# Patient Record
Sex: MALE | Race: BLACK OR AFRICAN AMERICAN | NOT HISPANIC OR LATINO | Employment: FULL TIME | URBAN - METROPOLITAN AREA
[De-identification: names, ages, dates, MRNs, and addresses within clinical notes are randomized per-mention and may not be internally consistent; named-entity substitution may affect disease eponyms.]

---

## 2017-05-01 VITALS
TEMPERATURE: 98 F | DIASTOLIC BLOOD PRESSURE: 91 MMHG | SYSTOLIC BLOOD PRESSURE: 146 MMHG | OXYGEN SATURATION: 97 % | BODY MASS INDEX: 28.79 KG/M2 | WEIGHT: 190 LBS | HEART RATE: 67 BPM | RESPIRATION RATE: 18 BRPM | HEIGHT: 68 IN

## 2017-05-01 PROCEDURE — 99283 EMERGENCY DEPT VISIT LOW MDM: CPT | Mod: 25

## 2017-05-01 PROCEDURE — 96372 THER/PROPH/DIAG INJ SC/IM: CPT

## 2017-05-01 PROCEDURE — 99284 EMERGENCY DEPT VISIT MOD MDM: CPT | Mod: ,,,

## 2017-05-02 ENCOUNTER — HOSPITAL ENCOUNTER (EMERGENCY)
Facility: HOSPITAL | Age: 47
Discharge: HOME | End: 2017-05-02
Attending: EMERGENCY MEDICINE
Payer: COMMERCIAL

## 2017-05-02 DIAGNOSIS — M54.31 SCIATICA OF RIGHT SIDE: Primary | ICD-10-CM

## 2017-05-02 DIAGNOSIS — M25.511 CHRONIC RIGHT SHOULDER PAIN: ICD-10-CM

## 2017-05-02 DIAGNOSIS — G89.29 CHRONIC RIGHT SHOULDER PAIN: ICD-10-CM

## 2017-05-02 PROCEDURE — 63600175 PHARM REV CODE 636 W HCPCS

## 2017-05-02 PROCEDURE — 25000003 PHARM REV CODE 250

## 2017-05-02 RX ORDER — NAPROXEN 500 MG/1
500 TABLET ORAL
Status: COMPLETED | OUTPATIENT
Start: 2017-05-02 | End: 2017-05-02

## 2017-05-02 RX ORDER — ORPHENADRINE CITRATE 30 MG/ML
60 INJECTION INTRAMUSCULAR; INTRAVENOUS
Status: COMPLETED | OUTPATIENT
Start: 2017-05-02 | End: 2017-05-02

## 2017-05-02 RX ORDER — NAPROXEN 500 MG/1
500 TABLET ORAL 2 TIMES DAILY WITH MEALS
Qty: 12 TABLET | Refills: 0 | Status: SHIPPED | OUTPATIENT
Start: 2017-05-02

## 2017-05-02 RX ORDER — METHOCARBAMOL 500 MG/1
1000 TABLET, FILM COATED ORAL 3 TIMES DAILY
Qty: 18 TABLET | Refills: 0 | Status: SHIPPED | OUTPATIENT
Start: 2017-05-02 | End: 2017-05-05

## 2017-05-02 RX ADMIN — NAPROXEN 500 MG: 500 TABLET ORAL at 12:05

## 2017-05-02 RX ADMIN — ORPHENADRINE CITRATE 60 MG: 30 INJECTION INTRAMUSCULAR; INTRAVENOUS at 12:05

## 2017-05-02 NOTE — DISCHARGE INSTRUCTIONS
Sciatica    Sciatica is a condition that causes pain in the lower back that spreads down into the buttock, hip, and leg. Sometimes the leg pain can happen without any back pain. Sciatica happens when a spinal nerve is irritated or has pressure put on it as comes out of the spinal canal in the lower back. This most often happens when a bulge or rupture of a nearby spinal disk presses on the nerve. Sciatica can also be caused by a narrowing of the spinal canal (spinal stenosis) or spasm of the muscle in the buttocks that the sciatic nerve passes through (pyriform muscle). Sciatica is also called lumbar radiculopathy.  Sciatica may begin after a sudden twisting or bending force, such as in a car accident. Or it can happen after a simple awkward movement. In either case, muscle spasm often also happens. Muscle spasm makes the pain worse.  A healthcare provider makes a diagnosis of sciatica from your symptoms and a physical exam. Unless you had an injury from a car accident or fall, you usually wont have X-rays taken at this time. This is because the nerves and disks in your back cant be seen on an X-ray. If the provider sees signs of a compressed nerve, you will need to schedule an MRI scan as an outpatient. Signs of a compressed nerve include loss of strength in a leg.  Most sciatica gets better with medicine, exercise, and physical therapy. If your symptoms continue after at least 3 months of medical treatment, you may need surgery or injections to your lower back.  Home care  Follow these tips when caring for yourself at home:  · You may need to stay in bed the first few days. But as soon as possible, begin sitting up or walking. This will help you avoid problems that come from staying in bed for long periods.  · When in bed, try to find a position that is comfortable. A firm mattress is best. Try lying flat on your back with pillows under your knees. You can also try lying on your side with your knees bent up  toward your chest and a pillow between your knees.  · Avoid sitting for long periods. This puts more stress on your lower back than standing or walking.  · Use heat from a hot shower, hot bath, or heating pad to help ease pain. Massage can also help. You can also try using an ice pack. You can make your own ice pack by putting ice cubes in a plastic bag. Wrap the bag in a thin towel. Try both heat and cold to see which works best. Use the method that feels best for 20 minutes several times a day.  · You may use acetaminophen or ibuprofen to ease pain, unless another pain medicine was prescribed. Note: If you have chronic liver or kidney disease, talk with your healthcare provider before taking these medicines. Also talk with your provider if youve had a stomach ulcer or gastrointestinal bleeding.  · Use safe lifting methods. Dont lift anything heavier than 15 pounds until all of the pain is gone.  Follow-up care  Follow up with your healthcare provider, or as advised. You may need physical therapy or additional tests.  If X-rays were taken, a radiologist will look at them. You will be told of any new findings that may affect your care.  When to seek medical advice  Call your healthcare provider right away if any of these occur:  · Pain gets worse even after taking prescribed medicine  · Weakness or numbness in 1 or both legs or hips  · Numbness in your groin or genital area  · You cant control your bowel or bladder  · Fever  · Redness or swelling over your back or spine   Date Last Reviewed: 8/1/2016  © 9812-0736 [employer]. [address] [place] [address], [place] [address]. All rights reserved. This information is not intended as a substitute for professional medical care. Always follow your healthcare professional's instructions.              Caring for Your Back Throughout the Day  Take care of your back throughout the day. You will likely have fewer back problems if you do. Try to warm up before you  move. Shift positions often. Also do your best to form healthy habits.    Warm up for the day  Do a few slow, catlike stretches before starting your day. This simple warmup can soften your disks, stretch your back muscles, and help prevent injuries.  Shift positions often  At work and at home, change positions often. This helps keep your body from getting stiff. Stand up or lean back while you sit. If you can, get up and move every 1/2 hour.  Form healthy habits  Here are some suggestions:   · Keep a healthy weight. When you weigh too much, your back is under excess strain. But losing just a few extra pounds can help a lot.  · Try not to overeat. Learn about serving sizes. The size of a serving depends on the food and the food group. Many foods list serving sizes on the labels.  · Handle minor aches with cold and heat. Apply cold the first 24 to 48 hours. Use heat after that. Always place a thin cloth between your skin and the source of cold or heat.  · Take medicines as directed. This helps keep pain under control. Always read labels, and call your healthcare provider or pharmacist if you have any questions.  Walk each day  A daily walk keeps your back and thigh muscles stretched and strong. This gives your back better support. Be sure to walk with your spines three curves aligned, by keeping your head, hips, and toes connected by a vertical line.   Date Last Reviewed: 10/18/2015  © 5492-9312 [employer]. [address] [place] [address], [place] [address]. All rights reserved. This information is not intended as a substitute for professional medical care. Always follow your healthcare professional's instructions.

## 2017-05-02 NOTE — ED AVS SNAPSHOT
[facility]  [address]  [facility] [address]               Cabrera Lomeli   2017 12:32 AM   ED    Description:  Male : 1970   [facility]           Your Care was Coordinated By:     Provider Role From To    Stephenie Ron MD Attending Provider 17 0033 --    Hetal Beebe PA-C Physician Assistant 17 --      Reason for Visit     Leg Pain           Diagnoses this Visit        Comments    Sciatica of right side    -  Primary       ED Disposition     ED Disposition Condition Comment    Discharge             To Do List           Follow-up Information     Schedule an appointment as soon as possible for a visit with Gavino Almonte MD.    Specialty:  Internal Medicine    Contact information:    [address]  [address]  [facility] [address]  [phone]         These Medications        Disp Refills Start End    naproxen (NAPROSYN) 500 MG tablet 12 tablet 0 2017     Take 1 tablet (500 mg total) by mouth 2 (two) times daily with meals. - Oral    Pharmacy: [facility] [id number] [facility] [address] AT [facility] [employer] [facility] #: [phone]       methocarbamol (ROBAXIN) 500 MG Tab 18 tablet 0 2017    Take 2 tablets (1,000 mg total) by mouth 3 (three) times daily. - Oral    Pharmacy: [facility] [address] [facility] [address] AT [facility] [employer] [facility] #: [phone]         [facility]     [facility] Care [facility] -  Assistance  Unless otherwise directed by your provider, please contact [facility], our nurse care line that is available for  assistance.     Registered nurses in the [facility] provide: appointment scheduling, clinical advisement, health education, and other advisory services.  Call: [phone] (toll free)               Medications           Message regarding Medications   "   Verify the changes and/or additions to your medication regime listed below are the same as discussed with your clinician today.  If any of these changes or additions are incorrect, please notify your healthcare provider.        START taking these NEW medications        Refills    naproxen (NAPROSYN) 500 MG tablet 0    Sig: Take 1 tablet (500 mg total) by mouth 2 (two) times daily with meals.    Class: Print    Route: Oral    methocarbamol (ROBAXIN) 500 MG Tab 0    Sig: Take 2 tablets (1,000 mg total) by mouth 3 (three) times daily.    Class: Print    Route: Oral      These medications were administered today        Dose Freq    orphenadrine injection 60 mg 60 mg ED 1 Time    Sig: Inject 2 mLs (60 mg total) into the muscle ED 1 Time.    Class: Normal    Route: Intramuscular    Cosign for Ordering: Required by Stephenie Ron MD    naproxen tablet 500 mg 500 mg ED 1 Time    Sig: Take 1 tablet (500 mg total) by mouth ED 1 Time.    Class: Normal    Route: Oral    Cosign for Ordering: Required by Stephenie Ron MD           Verify that the below list of medications is an accurate representation of the medications you are currently taking.  If none reported, the list may be blank. If incorrect, please contact your healthcare provider. Carry this list with you in case of emergency.           Current Medications     methocarbamol (ROBAXIN) 500 MG Tab Take 2 tablets (1,000 mg total) by mouth 3 (three) times daily.    naproxen (NAPROSYN) 500 MG tablet Take 1 tablet (500 mg total) by mouth 2 (two) times daily with meals.    naproxen tablet 500 mg Take 1 tablet (500 mg total) by mouth ED 1 Time.    orphenadrine injection 60 mg Inject 2 mLs (60 mg total) into the muscle ED 1 Time.           Clinical Reference Information           Your Vitals Were     BP Pulse Temp Resp Height Weight    146/91 (BP Location: Left arm, Patient Position: Sitting) 67 98.3 °F (36.8 °C) (Oral) 18 5' 8" (1.727 m) 86.2 kg (190 lb)    SpO2 BMI    "          97% 28.89 kg/m2         Allergies as of 5/2/2017     No Known Allergies      Immunizations Administered on Date of Encounter - 5/2/2017     None      ED Micro, Lab, POCT     None      ED Imaging Orders     None        Discharge Instructions         Sciatica    Sciatica is a condition that causes pain in the lower back that spreads down into the buttock, hip, and leg. Sometimes the leg pain can happen without any back pain. Sciatica happens when a spinal nerve is irritated or has pressure put on it as comes out of the spinal canal in the lower back. This most often happens when a bulge or rupture of a nearby spinal disk presses on the nerve. Sciatica can also be caused by a narrowing of the spinal canal (spinal stenosis) or spasm of the muscle in the buttocks that the sciatic nerve passes through (pyriform muscle). Sciatica is also called lumbar radiculopathy.  Sciatica may begin after a sudden twisting or bending force, such as in a car accident. Or it can happen after a simple awkward movement. In either case, muscle spasm often also happens. Muscle spasm makes the pain worse.  A healthcare provider makes a diagnosis of sciatica from your symptoms and a physical exam. Unless you had an injury from a car accident or fall, you usually wont have X-rays taken at this time. This is because the nerves and disks in your back cant be seen on an X-ray. If the provider sees signs of a compressed nerve, you will need to schedule an MRI scan as an outpatient. Signs of a compressed nerve include loss of strength in a leg.  Most sciatica gets better with medicine, exercise, and physical therapy. If your symptoms continue after at least 3 months of medical treatment, you may need surgery or injections to your lower back.  Home care  Follow these tips when caring for yourself at home:  · You may need to stay in bed the first few days. But as soon as possible, begin sitting up or walking. This will help you avoid  problems that come from staying in bed for long periods.  · When in bed, try to find a position that is comfortable. A firm mattress is best. Try lying flat on your back with pillows under your knees. You can also try lying on your side with your knees bent up toward your chest and a pillow between your knees.  · Avoid sitting for long periods. This puts more stress on your lower back than standing or walking.  · Use heat from a hot shower, hot bath, or heating pad to help ease pain. Massage can also help. You can also try using an ice pack. You can make your own ice pack by putting ice cubes in a plastic bag. Wrap the bag in a thin towel. Try both heat and cold to see which works best. Use the method that feels best for 20 minutes several times a day.  · You may use acetaminophen or ibuprofen to ease pain, unless another pain medicine was prescribed. Note: If you have chronic liver or kidney disease, talk with your healthcare provider before taking these medicines. Also talk with your provider if youve had a stomach ulcer or gastrointestinal bleeding.  · Use safe lifting methods. Dont lift anything heavier than 15 pounds until all of the pain is gone.  Follow-up care  Follow up with your healthcare provider, or as advised. You may need physical therapy or additional tests.  If X-rays were taken, a radiologist will look at them. You will be told of any new findings that may affect your care.  When to seek medical advice  Call your healthcare provider right away if any of these occur:  · Pain gets worse even after taking prescribed medicine  · Weakness or numbness in 1 or both legs or hips  · Numbness in your groin or genital area  · You cant control your bowel or bladder  · Fever  · Redness or swelling over your back or spine   Date Last Reviewed: 8/1/2016  © 4108-4694 [employer]. [address] [place] [address], [place] [address]. All rights reserved. This information is not intended as a substitute for  professional medical care. Always follow your healthcare professional's instructions.              Caring for Your Back Throughout the Day  Take care of your back throughout the day. You will likely have fewer back problems if you do. Try to warm up before you move. Shift positions often. Also do your best to form healthy habits.    Warm up for the day  Do a few slow, catlike stretches before starting your day. This simple warmup can soften your disks, stretch your back muscles, and help prevent injuries.  Shift positions often  At work and at home, change positions often. This helps keep your body from getting stiff. Stand up or lean back while you sit. If you can, get up and move every 1/2 hour.  Form healthy habits  Here are some suggestions:   · Keep a healthy weight. When you weigh too much, your back is under excess strain. But losing just a few extra pounds can help a lot.  · Try not to overeat. Learn about serving sizes. The size of a serving depends on the food and the food group. Many foods list serving sizes on the labels.  · Handle minor aches with cold and heat. Apply cold the first 24 to 48 hours. Use heat after that. Always place a thin cloth between your skin and the source of cold or heat.  · Take medicines as directed. This helps keep pain under control. Always read labels, and call your healthcare provider or pharmacist if you have any questions.  Walk each day  A daily walk keeps your back and thigh muscles stretched and strong. This gives your back better support. Be sure to walk with your spines three curves aligned, by keeping your head, hips, and toes connected by a vertical line.   Date Last Reviewed: 10/18/2015  © 4351-6904 [employer]. [address] [place] [address], [place] [address]. All rights reserved. This information is not intended as a substitute for professional medical care. Always follow your healthcare professional's instructions.           [facility]  complies with applicable Federal civil rights laws and does not discriminate on the basis of race, color, national origin, age, disability, or sex.        Language Assistance Services     ATTENTION: Language assistance services are available, free of charge. Please call [phone].      ATENCIÓN: Si habla español, tiene a hernandez disposición servicios gratuitos de asistencia lingüística. Llame al [phone].     CHÚ Ý: N?u b?n nói Ti?ng Vi?t, có các d?ch v? h? tr? ngôn ng? mi?n phí dành cho b?n. G?i s? [phone].

## 2017-05-02 NOTE — ED PROVIDER NOTES
Encounter Date: 5/1/2017    SCRIBE #1 NOTE: I, Leonardolorri Orozco, am scribing for, and in the presence of,  Hetal Beebe. I have scribed the following portions of the note - Other sections scribed: JORGE ALVARADO.   SCRIBE #2 NOTE: I, Darren Naresh, am scribing for, and in the presence of,  Dr. Ron. I have scribed the following portions of the note - the APC attestation.     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 right shoulder pain x 6-7months     Review of patient's allergies indicates:  No Known Allergies  HPI Comments: Time seen by provider: 12:34 AM    This is a 46 y.o. male with pertinent PMHx of UTI who presents to the ED with a chief complaint of (currently resolved for 2 weeks) brief daily flashes of moderate to severe sharp stabbing RLE pain x 2 months with associated tingling in his leg today. Pt states the pain would come on only while he was sitting down and would force him to stand up/kick his leg out, he states this would happen about once a day and was worsening but now hasn't happened for the last 2 weeks. However the pt has been feeling tingling in his leg today and thinks the pain is going to start up again. Pt denies history of sciatica, urine symptoms, bowel symptoms, chest pain, shortness of breath, abdominal pain, significant back pain, headache, or rashes. Pt also endorses secondary chronic right shoulder pain x 8 months after lifting weights that is unchanged but sore. Pt states he only drinks hot tea for his pains. There are no other associated symptoms or mitigating/aggravating factors reported at this time.     The history is provided by the patient.     Past Medical History:   Diagnosis Date    UTI (urinary tract infection) 12/30/2014     No past surgical history on file.  Family History   Problem Relation Age of Onset    Heart disease Mother     Stroke Mother   "   Cancer Neg Hx     Diabetes Neg Hx     Kidney disease Neg Hx      Social History   Substance Use Topics    Smoking status: Never Smoker    Smokeless tobacco: Not on file    Alcohol use Yes     Review of Systems   Constitutional: Negative for chills, fatigue and fever.   HENT: Negative for congestion.    Respiratory: Negative for shortness of breath.    Cardiovascular: Negative for chest pain.   Gastrointestinal: Negative for abdominal pain, constipation and diarrhea.   Genitourinary: Negative for difficulty urinating and dysuria.   Musculoskeletal: Negative for back pain.        Positive right shoulder pain.    Skin: Negative for rash.   Neurological: Positive for numbness (tingling sensation to right posterior thigh). Negative for headaches.        Positive right leg "tingling"   Psychiatric/Behavioral: The patient is not nervous/anxious.        Physical Exam   Initial Vitals   BP Pulse Resp Temp SpO2   05/01/17 2204 05/01/17 2204 05/01/17 2204 05/01/17 2204 05/01/17 2204   146/91 67 18 98.3 °F (36.8 °C) 97 %     Physical Exam    Vitals reviewed.  Constitutional: He appears well-developed and well-nourished. He is not diaphoretic. No distress.   HENT:   Head: Normocephalic and atraumatic.   Nose: Nose normal.   Mouth/Throat: No oropharyngeal exudate.   Eyes: Conjunctivae and EOM are normal. Pupils are equal, round, and reactive to light.   Neck: Normal range of motion. Neck supple. No thyromegaly present.   Cardiovascular: Normal rate and regular rhythm.   No murmur heard.  Pulmonary/Chest: Breath sounds normal. No respiratory distress. He has no wheezes. He has no rhonchi. He has no rales. He exhibits no tenderness.   Abdominal: Soft. Bowel sounds are normal. He exhibits no distension. There is no tenderness.   Musculoskeletal: Normal range of motion. He exhibits no edema.        Right upper leg: He exhibits no tenderness and no bony tenderness.   Lymphadenopathy:     He has no cervical adenopathy. "   Neurological: He is alert. He has normal strength and normal reflexes. He displays normal reflexes. No sensory deficit.   Skin: Skin is warm. No rash noted.   Psychiatric: He has a normal mood and affect.         ED Course   Procedures  Labs Reviewed - No data to display          Medical Decision Making:   History:   Old Medical Records: I decided to obtain old medical records.       APC / Resident Notes:   This is a 46 y.o. male with pertinent PMHx of UTI who presents to the ED with a chief complaint of (currently resolved for 2 weeks) brief daily flashes of moderate to severe sharp stabbing RLE pain x 2 months with associated tingling in his leg today.  Cardiac exam reveals regular rate ad rhythm.  Abdomn is soft, nonender, nondistended normal bowel sounds ×4.  Lungs are clear bilaterally on auscultation and decreased breath sounds.  No bony tenderness.  No muscular tenderness.  No pain with active or passive range of motion right shoulder or right lower extremity.  Strength intact.  Sensation intact.  Distal pulses intact.  Patient able to ambulate without difficulty.  Romberg negative.  Vital stable.  Patient is in no acute distress.    Patient given IM Norflex 60 mg and naproxen 500 mg.    DDX includes but is not limited to sciatica, lumbar radiculopathy, muscle spasm.    Discharged to home in stable condition, return to ED warnings given, follow up and patient care instructions given. Patient discharged home with robaxin 500mg and naproxen 500mg.     I discussed and reviewed with my supervising physician.       Scribe Attestation:   Scribe #1: I performed the above scribed service and the documentation accurately describes the services I performed. I attest to the accuracy of the note.  Scribe #2: I performed the above scribed service and the documentation accurately describes the services I performed. I attest to the accuracy of the note.    Attending Attestation:     Physician Attestation Statement for  NP/PA:   I discussed this assessment and plan of this patient with the NP/PA, but I did not personally examine the patient. The face to face encounter was performed by the NP/PA.    Other NP/PA Attestation Additions:    History of Present Illness: Sciatica          Physician Attestation for Scribe:  Physician Attestation Statement for Scribe #1: I, Hetal Beebe, reviewed documentation, as scribed by Leonardo Orozco in my presence, and it is both accurate and complete.   Physician Attestation Statement for Scribe #2: I, Dr. Ron, reviewed documentation, as scribed by Darren Infante in my presence, and it is both accurate and complete. I also acknowledge and confirm the content of the note done by Noibe #1.              ED Course     Clinical Impression:   The primary encounter diagnosis was Sciatica of right side. A diagnosis of Chronic right shoulder pain was also pertinent to this visit.    Disposition:   Disposition: Discharged  Condition: Stable       Hetal Beebe PA-C  05/02/17 0147

## 2024-08-25 ENCOUNTER — OFFICE VISIT (OUTPATIENT)
Dept: URGENT CARE | Facility: CLINIC | Age: 54
End: 2024-08-25

## 2024-08-25 VITALS
HEIGHT: 68 IN | WEIGHT: 190.06 LBS | HEART RATE: 62 BPM | RESPIRATION RATE: 18 BRPM | OXYGEN SATURATION: 98 % | DIASTOLIC BLOOD PRESSURE: 90 MMHG | SYSTOLIC BLOOD PRESSURE: 137 MMHG | TEMPERATURE: 98 F | BODY MASS INDEX: 28.8 KG/M2

## 2024-08-25 DIAGNOSIS — B35.1 ONYCHOMYCOSIS: Primary | ICD-10-CM

## 2024-08-25 DIAGNOSIS — R20.0 NUMBNESS AND TINGLING IN BOTH HANDS: ICD-10-CM

## 2024-08-25 DIAGNOSIS — B35.3 TINEA PEDIS OF BOTH FEET: ICD-10-CM

## 2024-08-25 DIAGNOSIS — R20.2 NUMBNESS AND TINGLING IN BOTH HANDS: ICD-10-CM

## 2024-08-25 PROCEDURE — 99214 OFFICE O/P EST MOD 30 MIN: CPT | Mod: TIER,S$GLB,, | Performed by: FAMILY MEDICINE

## 2024-08-25 RX ORDER — CLOTRIMAZOLE 1 %
CREAM (GRAM) TOPICAL
Qty: 28 G | Refills: 1 | Status: SHIPPED | OUTPATIENT
Start: 2024-08-25

## 2024-08-25 RX ORDER — CICLOPIROX 80 MG/ML
SOLUTION TOPICAL
Qty: 6.6 ML | Refills: 11 | Status: SHIPPED | OUTPATIENT
Start: 2024-08-25

## 2024-08-25 NOTE — PROGRESS NOTES
"Subjective:      Patient ID: Cabrera Lomeli is a 53 y.o. male.    Vitals:  height is 5' 8" (1.727 m) and weight is 86.2 kg (190 lb 0.6 oz). His temperature is 98.3 °F (36.8 °C). His blood pressure is 137/90 (abnormal) and his pulse is 62. His respiration is 18 and oxygen saturation is 98%.     Chief Complaint: Nail Problem    Pt states that the his nails on both feet are dark discolored and been going on for many months if not years.  and present to be itchy and feet are peeling which has been going on for weeks to months.  Pt also stated that his fingers are also tingling .  He reports no trauma to the neck or neck pain.  No tingling in the feet.  He has not had a primary care visit or wellness visit in many years.  Has not had blood work in many years.  He reports no diabetes.  He had a slight elevated hemoglobin A1c many years ago recorded in  [facility].     Other  This is a chronic problem. The current episode started more than 1 month ago. Pertinent negatives include no numbness. Treatments tried: cortisone cream.       Neurological:  Negative for numbness.      Objective:     Physical Exam  Constitutional: Pt oriented to person, place, and time.  Non-toxic appearance.   Patient does not appear ill. No distress. normal  HENT: No icterus or facial swelling appreciated  Head: Normocephalic and atraumatic.   Nose: No congestion.   Pulmonary/Chest: Effort normal. No stridor. No respiratory distress.   Abdominal: Normal appearance. Abdomen exhibits no distension.   Musculoskeletal:         General: No swelling.   Neurological: no focal deficit. Patient is alert and oriented to person, place, and time.   Skin: feet:  There is thickening and darkening of the toenails with subungual debris most toes bilateral feet.    There is some xerosis and small amount of skin peeling of dry skin on bilateral feet skin  Psychiatric: Patients behavior is normal. Mood, judgment and thought content normal.     Assessment:     1. Onychomycosis "    2. Tinea pedis of both feet    3. Numbness and tingling in both hands        Plan:       Onychomycosis  -     ciclopirox (PENLAC) 8 % Soln; Apply nightly to nails x 48 weeks. Apply at least 8 hours before next shower/bath. Clean nails with rubbing alcohol once weekly during treatment  Dispense: 6.6 mL; Refill: 11  -     Ambulatory referral/consult to Podiatry    Tinea pedis of both feet  -     clotrimazole (LOTRIMIN) 1 % cream; Apply to both feet  twice daily x  28 days. Apply nightly dose after using Penlac solution to nails .  Dispense: 28 g; Refill: 1  -     Ambulatory referral/consult to Podiatry    Numbness and tingling in both hands  Needs workup by PCP or neurologist.  Discussed with patient.     Patient did have blood sugar test today which read just over 100    Patient Instructions   For your toenail issue, it appears like it was a toenail fungus.    I recommend using the Penlac solution as prescribed.  If it is pleurisy out-of-pocket you can use apps such as goodRx.com to find that she missed pharmacy.        In addition I recommend using clotrimazole 1% cream.  This is prescription or over-the-counter.  I would recommend purchasing the 1 that is cheapest.  Use this twice daily for your feet for the next 28 days or 1 month.      As for the hand numbness it is difficult to say with the causes in an urgent care setting as it maybe metabolic such as thyroid issue or vitamin deficiencies such as B12 or folic acid or high sugars or it could even be a nerve issue from the neck.      I do recommend getting established with a primary care physician for routine health screenings.  For routine blood work you may consider even ordering your own wellness panel and then taking the results to a primary care physician for their review and recommendations.  There is an online 3rd party service called [employer] where a patient can order their own lab tests without a provider order.     You can always follow up with  the podiatrist for your foot conditions.  There is an [facility] pod[facility]rist , Leni Mathis I have used myself and was quite knowlegable.    [facility] with Dr Mathis:  [id number] [place] [address]  Phone: [phone]  You can also considered a private podiatrist outside of the [facility].

## 2024-08-25 NOTE — PATIENT INSTRUCTIONS
For your toenail issue, it appears like it was a toenail fungus.    I recommend using the Penlac solution as prescribed.  If it is pleurisy out-of-pocket you can use apps such as goodRx.com to find that she missed pharmacy.        In addition I recommend using clotrimazole 1% cream.  This is prescription or over-the-counter.  I would recommend purchasing the 1 that is cheapest.  Use this twice daily for your feet for the next 28 days or 1 month.      As for the hand numbness it is difficult to say with the causes in an urgent care setting as it maybe metabolic such as thyroid issue or vitamin deficiencies such as B12 or folic acid or high sugars or it could even be a nerve issue from the neck.      I do recommend getting established with a primary care physician for routine health screenings.  For routine blood work you may consider even ordering your own wellness panel and then taking the results to a primary care physician for their review and recommendations.  There is an online 3rd party service called [employer] where a patient can order their own lab tests without a provider order.     You can always follow up with the podiatrist for your foot conditions.  There is an [facility] pod[facility]rist , Leni Mathis I have used myself and was quite knowlegable.    [facility] with Dr Mathis:  [id number] [place] [address]  Phone: [phone]  You can also considered a private podiatrist outside of the [facility].